# Patient Record
Sex: MALE
[De-identification: names, ages, dates, MRNs, and addresses within clinical notes are randomized per-mention and may not be internally consistent; named-entity substitution may affect disease eponyms.]

---

## 2018-06-17 ENCOUNTER — HOSPITAL ENCOUNTER (OUTPATIENT)
Dept: HOSPITAL 25 - ED | Age: 58
Setting detail: OBSERVATION
LOS: 1 days | Discharge: HOME | End: 2018-06-18
Attending: HOSPITALIST | Admitting: HOSPITALIST
Payer: COMMERCIAL

## 2018-06-17 DIAGNOSIS — K21.9: ICD-10-CM

## 2018-06-17 DIAGNOSIS — J30.9: ICD-10-CM

## 2018-06-17 DIAGNOSIS — T67.5XXA: ICD-10-CM

## 2018-06-17 DIAGNOSIS — R55: Primary | ICD-10-CM

## 2018-06-17 DIAGNOSIS — Z88.8: ICD-10-CM

## 2018-06-17 DIAGNOSIS — D45: ICD-10-CM

## 2018-06-17 DIAGNOSIS — Z79.899: ICD-10-CM
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
## 2018-06-17 DIAGNOSIS — Z88.2: ICD-10-CM

## 2018-06-17 DIAGNOSIS — Z79.82: ICD-10-CM

## 2018-06-17 PROCEDURE — 99283 EMERGENCY DEPT VISIT LOW MDM: CPT

## 2018-06-17 PROCEDURE — G0378 HOSPITAL OBSERVATION PER HR: HCPCS

## 2018-06-17 PROCEDURE — 96361 HYDRATE IV INFUSION ADD-ON: CPT

## 2018-06-17 PROCEDURE — 70544 MR ANGIOGRAPHY HEAD W/O DYE: CPT

## 2018-06-17 PROCEDURE — 70470 CT HEAD/BRAIN W/O & W/DYE: CPT

## 2018-06-17 PROCEDURE — 80048 BASIC METABOLIC PNL TOTAL CA: CPT

## 2018-06-17 PROCEDURE — 85025 COMPLETE CBC W/AUTO DIFF WBC: CPT

## 2018-06-17 PROCEDURE — 36415 COLL VENOUS BLD VENIPUNCTURE: CPT

## 2018-06-17 PROCEDURE — 96374 THER/PROPH/DIAG INJ IV PUSH: CPT

## 2018-06-17 NOTE — RAD
HISTORY: CT VENOUS PHASE CONTRAST,EVAL VENOUS SINUS THROMBO, polycythemia vera, history of

sinus thrombosis on outside CT.



COMPARISONS: None



TECHNIQUE: Multiple contiguous axial CT scans were obtained of the head with and without

intravenous contrast. Coronal and sagittal multiplanar reformations are also submitted for

review.



FINDINGS: 

HEMORRHAGE/INFARCT: There is no hemorrhage or acute infarct.

MASSES/SHIFT: There is no mass or shift.



EXTRA-AXIAL SPACES: There are no extra-axial fluid collections.

SULCI AND VENTRICLES: The sulci and ventricles are normal in size and position for the

patient's stated age.



CEREBRUM: There are no focal parenchymal abnormalities.

BRAINSTEM: There are no focal parenchymal abnormalities.

CEREBELLUM: There are no focal parenchymal abnormalities.



VESSELS: The venous sinuses are patent. The filling defect noted at the left transverse

sinus on the outside report is again identified. This is slightly lobulated and

well-circumscribed measuring 0.6 cm in size.

PARANASAL SINUSES: There is a mucous retention cyst versus polypoid mucosal thickening of

the right maxillary sinus.

ORBITS: The orbits are unremarkable.

BONES AND SOFT TISSUE: No bone or soft tissue abnormalities are noted.



OTHER: None



IMPRESSION: 

THE FILLING DEFECT DESCRIBED IN THE LEFT TRANSVERSE SINUS ON THE OUTSIDE REPORT IS AGAIN

IDENTIFIED. WHILE SINUS THROMBOSIS IS WITHIN THE DIFFERENTIAL, THIS IS A TYPICAL LOCATION

AND APPEARANCE FOR AN ARACHNOID GRANULATION. THE VENOUS SINUSES ARE OTHERWISE PATENT.

## 2018-06-17 NOTE — ED
Keily PIZARRO Jade, scribed for Francisco Garces MD on 06/17/18 at 1921 .





Syncope/Near Syncope





- HPI Summary


HPI Summary: 


Pt is a 57 y/o male BIBA transferred from [facility] s/p syncope at 15:30 . He 

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 

syncopal episode witnessed by his wife. [facility] wanted an MRI, and found a new 

intracranial thrombus. Upon arrival to the ED, he started to have a headache 

thats described as 2/10 in intensity and feels like a sinus headache. Pt has 

hypercoagulable blood, and occasionally gets a pint of blood removed. As per 

wife, pt has had similar episodes in the past.








- History Of Current Complaint


Chief Complaint: EDHeadache


Time Seen by Provider: 06/17/18 19:03


Hx Obtained From: Patient, Family/Caretaker - Wife


Onset/Duration: Sudden Onset, Resolved


Context: Witnessed


Activity At Onset: Other - Went inside after being outdoors in the sun all day





- Allergies/Home Medications


Allergies/Adverse Reactions: 


 Allergies











Allergy/AdvReac Type Severity Reaction Status Date / Time


 


ampicillin Allergy  Hives Verified 06/17/18 18:36


 


cefaclor [From Ceclor] Allergy  Hives Verified 06/17/18 18:36


 


Penicillins Allergy  Hives Verified 06/17/18 18:36


 


Sulfa (Sulfonamide Allergy  Hives Verified 06/17/18 18:36





Antibiotics)     


 


sulfamethoxazole Allergy  Hives Verified 06/17/18 18:36





[From Bactrim]     


 


trimethoprim [From Bactrim] Allergy  Hives Verified 06/17/18 18:36











Home Medications: 


 Home Medications





Aspirin 81 mg CHEW TAB* [Aspirin Low Dose TAB*] 81 mg PO DAILY 06/17/18 [

History Confirmed 06/17/18]


Cetirizine HCl/Pseudoephedrine [Zyrtec-D Tablet] 1 each PO DAILY 06/17/18 [

History Confirmed 06/17/18]


Multivit-Min/Iron Fum/Folic AC [Multi Vitamin and Mineral] 1 tab PO DAILY 06/17/ 18 [History Confirmed 06/17/18]


Omeprazole CAP* [Prilosec CAP* 20 MG] 20 mg PO DAILY 06/17/18 [History 

Confirmed 06/17/18]











PMH/Surg Hx/FS Hx/Imm Hx


Endocrine/Hematology History: Reports: Hx Blood Disorders - hypercoagulable 

blood


GI History: Reports: Hx Gastroesophageal Reflux Disease


Infectious Disease History: No


Infectious Disease History: 


   Denies: Traveled Outside the [place] in Last 30 Days





- Family History


Known Family History: Positive: Blood Disorder - Blood clots, Other - Stroke





- Social History


Alcohol Use: Occasionally


Substance Use Type: Reports: None


Smoking Status (MU): Never Smoked Tobacco





Review of Systems


Negative: Fever


Positive: Headache, Syncope


All Other Systems Reviewed And Are Negative: Yes





Physical Exam





- Summary


Physical Exam Summary: 


General: well-appearing, no pain distress


Skin: warm, color reflects adequate perfusion, dry


Head: normal


Eyes: EOMI, JARRETT


ENT: normal


Neck: supple, nontender


Respiratory: CTA, breath sounds present


Cardiovascular: RRR


Abdomen: soft, nontender


Bowel: present


Musculoskeletal: normal, strength/ROM intact


Neurological: sensory/motor intact, A&O x3


Psychological: affect/mood appropriate





Triage Information Reviewed: Yes


Vital Signs On Initial Exam: 


 Initial Vitals











Temp Pulse Resp BP Pulse Ox


 


 98.8 F   64   13   145/85   97 


 


 06/17/18 18:31  06/17/18 18:31  06/17/18 18:31  06/17/18 18:31  06/17/18 18:31











Vital Signs Reviewed: Yes





Diagnostics





- Vital Signs


 Vital Signs











  Temp Pulse Resp BP Pulse Ox


 


 06/17/18 18:31  98.8 F  64  13  145/85  97














- Laboratory


Lab Statement: Any lab studies that have been ordered have been reviewed, and 

results considered in the medical decision making process.





Course/Dx


Course Of Treatment: DISCUSSED WITH DR MARIE, NEUROLOGY.  ADMIT OVERNIGHT FOR 

MRV 6/18/18.





- Diagnoses


Provider Diagnoses: 


 Headache








Discharge





- Sign-Out/Discharge


Documenting (check all that apply): Discharge/Admit/Transfer





- Discharge Plan


Condition: Stable


Disposition: ADMITTED TO [facility]


Referrals: 


Rodolfo SNYDER,Octavio SANDHU [Primary Care Provider] - 





- Billing Disposition and Condition


Condition: STABLE


Disposition: Admitted to [facility]





The documentation as recorded by the Keily rojas Jade accurately reflects the 

service I personally performed and the decisions made by me, Francisco Garces MD.

## 2018-06-18 VITALS — DIASTOLIC BLOOD PRESSURE: 108 MMHG | SYSTOLIC BLOOD PRESSURE: 153 MMHG

## 2018-06-18 LAB
BASOPHILS # BLD AUTO: 0.1 10^3/UL (ref 0–0.2)
EOSINOPHIL # BLD AUTO: 0.1 10^3/UL (ref 0–0.6)
HCT VFR BLD AUTO: 47 % (ref 42–52)
HGB BLD-MCNC: 16.3 G/DL (ref 14–18)
LYMPHOCYTES # BLD AUTO: 3.3 10^3/UL (ref 1–4.8)
MCH RBC QN AUTO: 32 PG (ref 27–31)
MCHC RBC AUTO-ENTMCNC: 35 G/DL (ref 31–36)
MCV RBC AUTO: 93 FL (ref 80–94)
MONOCYTES # BLD AUTO: 0.7 10^3/UL (ref 0–0.8)
NEUTROPHILS # BLD AUTO: 5.1 10^3/UL (ref 1.5–7.7)
NRBC # BLD AUTO: 0 10^3/UL
NRBC BLD QL AUTO: 0.1
PLATELET # BLD AUTO: 267 10^3/UL (ref 150–450)
RBC # BLD AUTO: 5.05 10^6/UL (ref 4–5.4)
WBC # BLD AUTO: 9.2 10^3/UL (ref 3.5–10.8)

## 2018-06-18 RX ADMIN — SODIUM CHLORIDE SCH MLS/HR: 900 IRRIGANT IRRIGATION at 03:10

## 2018-06-18 RX ADMIN — SODIUM CHLORIDE SCH MLS/HR: 900 IRRIGANT IRRIGATION at 12:06

## 2018-06-18 NOTE — RAD
HISTORY: Evaluate for sinus thrombosis



COMPARISONS: CT venogram dated June 17, 2018



TECHNIQUE: Multiple 3-D phase contrast MR venography was performed, with multiple 3-D

maximum intensity projection reconstructions.



FINDINGS:



VENOUS SINUSES: The venous sinuses are patent. There is no stenosis or occlusion. Again

noted is a filling defect of the left transverse sinus. This is smoothly marginated. This

is similar in appearance to the previous examination. The left transverse and sigmoid

system is dominant over the right. Based on the size of the jugular foramen on the

previous CT examination, this is consistent with congenital anatomic variation..

DEEP VEINS: The deep veins are patent. The internal cerebral veins are dominant over the

basal veins of Ese.



OTHER FINDINGS: None



IMPRESSION: 

AGAIN NOTED IS A FILLING DEFECT OF THE LEFT TRANSVERSE SINUS. THE APPEARANCE AND LOCATION

IS AGAIN MOST SUGGESTIVE OF AN ARACHNOID GRANULATION, THOUGH A SMALL THROMBUS IS ALSO

STILL WITHIN THE DIFFERENTIAL.

## 2018-06-18 NOTE — HP
H&P (Free Text)


History and Physical: 





PCP: GOGO Reynolds MD





Date/Time: 2018 0100





CC: syncope





HPI: Mr Alcala is a 59YO male HX GERD & allergic rhinitis who was 

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 

in the heat admittedly not drinking enough water when he became 'hot' went 

inside, and 'passed out' witnessed by his wife. He has had similar episodes in 

the past. He denies chest pain, SOB, palpitations, N/V/D, or other issues. Upon 

evaluation at [facility], a CT brain WO was reported as a possible thrombus in 

the L transverse sinus. While at [facility], he developed a retro-orbital 

headache, but no other symptoms. He was transferred to [facility] for further 

evaluation where a repeat CT brain WO was performed again identifying the 

filling defect and while not being able to rule out thrombus, reports the 

location & appearance is typical of an arachnoid granulation. Case was reviewed 

by ED MD & JIGNESH Collado MD neurology who felt it best to observe the patient and 

arrange further testing in the AM after evaluation. Headache did not respond to 

acetaminophen, but improved after hydromorphone IV.





PMedHx


GERD


allergic rhinitis


'hypercoagulable'





Ambulatory Orders


Aspirin 81 mg CHEW TAB* [Aspirin Low Dose TAB*] 81 mg PO DAILY 18 


Cetirizine HCl/Pseudoephedrine [Zyrtec-D Tablet] 1 each PO DAILY 18 


Multivit-Min/Iron Fum/Folic AC [Multi Vitamin and Mineral] 1 tab PO DAILY  


Omeprazole CAP* [Prilosec CAP* 20 MG] 20 mg PO DAILY 18 





Allergies


ampicillin Allergy (Verified 18 18:36)


 Hives


cefaclor [From Ceclor] Allergy (Verified 18 18:36)


 Hives


Penicillins Allergy (Verified 18 18:36)


 Hives


Sulfa (Sulfonamide Antibiotics) Allergy (Verified 18 18:36)


 Hives


sulfamethoxazole [From Bactrim] Allergy (Verified 18 18:36)


 Hives


trimethoprim [From Bactrim] Allergy (Verified 18 18:36)


 Hives





PSurgHx


tonsillectomy





SocHx: no tobacco, 2-3 beers/week, no recreational drugs; , lives with 

his ex-wife; 2 adult children; retired ; full code status





FamHx: Mother: alive at 82 w/ CVA; Father;  in his 60s 2nd colon CA





ROS: as above, otherwise reviewed and all were negative





vitals: 


 Vital Signs











Temp  36.5 C   18 03:00


 


Pulse  55   18 03:00


 


Resp  18   18 03:00


 


BP  152/91   18 03:00


 


Pulse Ox  96   18 03:00








 Intake & Output











 18





 11:59 23:59 11:59


 


Intake Total  1000 


 


Balance  1000 


 


Weight  100.698 kg 101.106 kg


 


Intake:   


 


  IV Fluids  1000 








Constitutional: NAD, normally developed, overweight white male


HEENM: atraumatic; sclera/conjunctiva: anicteric/clear; hearing: clinically 

intact; oropharynx: clear, mucosa moist


Neck: soft tissue: non-tender; thyroid: normal


Pulmonary: clear to auscultation bilaterally, good aeration, no accessory 

muscle use 


CV: RR/RR, normal S1S2, no carotid bruit, no jugular venous distention, 2+ B DP/

PT, no edema


Abdominal: soft, non-distended, non-tender, no rebound/guarding/rigidity, 

normoactive bowel sounds, no hepatosplenomegaly or masses, no costovertebral 

angle tenderness


Musculoskeletal: general: grossly intact, non-tender


Integumental: normal appearance and texture of exposed skin





Psychiatric 


orientation: AA&O to PPS


affect: calm


mood: cooperative


eye contact: good


content: reliable


responses: timely


insight: good





Testing:


CT brain WO, personally reviewed: IMPRESSION: THE FILLING DEFECT DESCRIBED IN 

THE LEFT TRANSVERSE SINUS ON THE OUTSIDE REPORT IS AGAIN IDENTIFIED. WHILE 

SINUS THROMBOSIS IS WITHIN THE DIFFERENTIAL, THIS IS A TYPICAL LOCATION AND 

APPEARANCE FOR AN ARACHNOID GRANULATION. THE VENOUS SINUSES ARE OTHERWISE 

PATENT.





Impression: 58M presenting with heat exhaustion & syncope with finding 

concerning for possible L transverse sinus thrombus vs an arachnoid granulation





DIAGNOSIS & PLAN


Primary 


heat exhaustion & syncope


: IVFs


: supportive care





abnormal CT brain WO


: concerning for possible L transverse sinus thrombus vs an arachnoid 

granulation


: JIGNESH Collado MD neurology consulted via ED, will evaluate and arrange further 

testing in AM


: neurochecks





Secondary 


GERD


: continue omeprazole





Admission Rational: observation for further evaluation of abnormal CT brain 

finding


DVTp: SCDs


Code Status: full


HCP: wife

## 2018-06-19 NOTE — DS
*** AMENDED REPORT NOW INCLUDES COSIGNER DESIGNATION - ESIGNED BEFORE 
ADUSTMENTS ***



CC:  Dr. Octavio Reynolds*

 

DISCHARGE SUMMARY:

 

DATE OF ADMISSION:  18

 

DATE OF DISCHARGE:  18

 

PATIENT OF:  Dr. Frankenberg.

 

ATTENDING HOSPITALIST:  Dr. Nazia Kate.* (DICTATED BY JOSE CASTRO)

 

PRIMARY CARE PHYSICIAN:  Dr. Octavio Reynolds from [facility].

 

ADMISSION DIAGNOSES:

1.  Syncope.

2.  History of gastroesophageal reflux disease.

3.  History of allergic rhinitis.

 

DISCHARGE DIAGNOSES:

1.  Syncope.

2.  History of gastroesophageal reflux disease.

3.  History of allergic rhinitis.

4.  Heat exhaustion.

 

ADMITTING PHYSICIAN:  Fred Frankenberg, MD

 

CONSULTATIONS:  Dr. Betsy Iqbal from Neurology.

 

PROCEDURES:  None.

 

BRIEF MEDICAL HISTORY:  Mr. Alcala is a pleasant 58-year-old gentleman, who 
has a history of GERD and allergic rhinitis, who was transferred from [facility] last night after he presented earlier that day with complaints 
of syncopal episode.  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 
with a chainsaw during the intense heat during the day with temperature 
reaching well above 95 degrees.  He has been admitting not to be drinking 
enough water, when he became hot and went inside and eventually had a syncopal 
episode witnessed by his wife.  He has had similar episodes in the past where 
he had passed out for few seconds and then returned to consciousness with no 
other complaints of chest pain, shortness of breath, palpitation, nausea, 
vomiting, or any other issues.  He had a brain CT at [facility] 
that reported as a possible thrombus in the left transverse sinus.  While at 
[facility], he also developed a headache, but no other 
neurological deficits and given his recurrent symptoms, he was transferred to 
[facility] for further evaluation. CT of the brain was performed again in the [facility] last night identifying a filling deficit, which could possibly 
be a transverse sinus thrombus versus arachnoid granulation.  The case was 
reviewed with neurologist, Dr. Collado, who felt it is best to observe the 
patient overnight and further testing in the morning after evaluation.  The 
patient was admitted for observation at the telemetry unit for neurological 
checks.

 

HOSPITAL COURSE:  The patient was admitted under hospitalist services on . He was monitored closely in the telemetry unit with neurological checks.  
He continued to have no other symptoms and his headache has been relieved using 
Tylenol eventually.  He was kept n.p.o. and was seen on the following morning 
by Dr. Iqbal.  An MRI with venogram of the brain was ordered and evaluated by 
Dr. Iqbal.  The patient was ambulatory, out of bed and had no significant 
symptoms.  A repeat MRI was done and revealed the same possibility of arachnoid 
granulation versus very slim possibility of transverse sinus thrombus for which 
it was felt by the neurology team that the patient does not need to be on any 
anticoagulation therapy.  He continued to exhibit no neurological deficit.  His 
laboratory workup was obtained revealing normal white count, normal hemoglobin 
and hematocrit, as well as platelet numbers and his chemistry panel was 
essentially within normal limits.  We discussed with the patient all discharge 
plans and I had a long discussion with Dr. Iqbal regarding plan of care.  At 
this point, we are almost certain that the findings on his CT and MRI are more 
than likely related to an anatomical variation such as arachnoid granulation 
and it does not have any strong possibility of a transverse sinus thrombus for 
which anticoagulation therapy is not indicated at this time.  The patient will 
continue taking his aspirin daily and will resume all his medication.  He was 
advised to rest at home and increase fluid intake and to avoid any excessive 
working outdoors during the heat of the day, especially if the temperatures are 
above 90 degrees with very high humidity.  All his questions were answered and 
he will be discharged home and to follow up with his primary care physician 
next week.

 

DISCHARGE MEDICATIONS:  Includin.  Aspirin 81 mg chewable 1 p.o. daily.

2.  Cetirizine/HCl.

3.  Zyrtec 1 tablet p.o. daily.

4.  Multivitamin with folic acid 1 tablet p.o. daily.

5.  Omeprazole 20 mg p.o. daily.

 

PHYSICAL EXAMINATION:  On examination today, the patient was stable with vitals 
showing temperature of 97.8, pulse of 63, blood pressure of 139/94, 
respirations of 16, and O2 sat of 98%.  His ENT exam was regular, reactive, and 
equal pupils, and moist mucous membranes.  His lungs were clear to auscultation 
bilaterally.  His heart was regular rate and rhythm without rubs, murmurs, or 
gallops.  His extremities without cyanosis, clubbing, or edema.  His neurologic 
exam was essentially unremarkable with equal  and tongue was midline and 
sensation intact throughout.

 

____________________________________ JOSE CASTRO

 

[id number]/CPS #: [id number]

Rome Memorial HospitalMEAGHAN